# Patient Record
Sex: FEMALE | URBAN - METROPOLITAN AREA
[De-identification: names, ages, dates, MRNs, and addresses within clinical notes are randomized per-mention and may not be internally consistent; named-entity substitution may affect disease eponyms.]

---

## 2022-04-16 ENCOUNTER — NURSE TRIAGE (OUTPATIENT)
Dept: ADMINISTRATIVE | Facility: CLINIC | Age: 22
End: 2022-04-16

## 2022-04-17 NOTE — TELEPHONE ENCOUNTER
Pt calling to update that she is being evaluated in the ED currently. Asked to speak with previous triage nurse in this regard. Warm transferred.     Reason for Disposition   General information question, no triage required and triager able to answer question    Protocols used: INFORMATION ONLY CALL - NO TRIAGE-A-

## 2022-04-17 NOTE — TELEPHONE ENCOUNTER
Patient c/o worsening depression and history of Bipolar Disorder, Borderline Personality, Major Depressive Disorder and Anxiety. Patient states her coverage with Medicaid recently ended and she has not been able to afford her antipsychotic medication, Latuda. Patient also states that she is alone with  her 5 year old son and denies Suicidal Ideations and Homicidal Ideations at this time, but states history of suicidal ideations. Patient also c/o increased tiredness.     Care Advice to Go To ED Now. EMS/911 call offered by Triage RN. Patient declined EMS/911 activation by Triage RN and stated would call Aunt for transport to ED.        Reason for Disposition   [1] Bipolar disorder (manic depression) AND [2] unable to do any of normal activities (e.g., self care, school, work; in comparison to baseline).    Additional Information   Negative: Patient attempted suicide   Negative: Patient is threatening suicide now   Negative: Violent behavior, or threatening to physically hurt or kill someone   Negative: [1] Patient is very confused (disoriented, slurred speech) AND [2] no other adult (e.g., friend or family member) available   Negative: [1] Difficult to awaken or acting very confused (disoriented, slurred speech) AND [2] new-onset   Negative: Drug overdose suspected   Negative: Sounds like a life-threatening emergency to the triager   Negative: Suicide thoughts, threats, attempts, or questions   Negative: Questions or concerns about alcohol use, unhealthy alcohol use, binge drinking, intoxication, or withdrawal   Negative: Questions or concerns about substance use (drug use), unhealthy drug use, intoxication, or withdrawal    Protocols used: BIPOLAR DISORDER (MANIC DEPRESSION)-A-

## 2022-04-20 ENCOUNTER — NURSE TRIAGE (OUTPATIENT)
Dept: ADMINISTRATIVE | Facility: CLINIC | Age: 22
End: 2022-04-20

## 2022-04-20 NOTE — TELEPHONE ENCOUNTER
Anitra calling and asks how high does her temperature need to be to go to ED. Denies S&S of dehydration, resp distress, or chest discomfort. Protocol reviewed and care advice given. Pt agrees with advice and verbalizes understanding. Instructed to call for questions, concerns or changes.  Reason for Disposition   [1] Fever AND [2] no signs of serious infection or localizing symptoms (all other triage questions negative)    Additional Information   Negative: Shock suspected (e.g., cold/pale/clammy skin, too weak to stand, low BP, rapid pulse)   Negative: Difficult to awaken or acting confused (e.g., disoriented, slurred speech)   Negative: [1] Difficulty breathing AND [2] bluish lips, tongue or face   Negative: New-onset rash with multiple purple (or blood-colored) spots or dots   Negative: Sounds like a life-threatening emergency to the triager   Negative: [1] Headache AND [2] stiff neck (can't touch chin to chest)   Negative: Difficulty breathing   Negative: IV Drug Use (IVDU)   Negative: [1] Drinking very little AND [2] dehydration suspected (e.g., no urine > 12 hours, very dry mouth, very lightheaded)   Negative: Patient sounds very sick or weak to the triager  (Exception: mild weakness and hasn't taken fever medicine)   Negative: Fever > 104 F (40 C)   Negative: [1] Fever > 101 F (38.3 C) AND [2] age > 60 years   Negative: [1] Fever > 100.0 F (37.8 C) AND [2] bedridden (e.g., nursing home patient, CVA, chronic illness, recovering from surgery)   Negative: [1] Fever > 100.0 F (37.8 C) AND [2] indwelling urinary catheter (e.g., Ferrari, Coude)   Negative: [1] Fever > 100.0 F (37.8 C) AND [2] has port (portacath), central line, or PICC line   Negative: [1] Fever > 100.0 F (37.8 C) AND [2] diabetes mellitus or weak immune system (e.g., HIV positive, cancer chemo, splenectomy, organ transplant, chronic steroids)   Negative: [1] Fever > 100.0 F (37.8 C) AND [2] surgery in the last month   Negative:  Transplant patient (e.g., kidney, liver, lung, heart)   Negative: Fever present > 3 days (72 hours)   Negative: [1] Fever > 100.0 F (37.8 C) AND [2] foreign travel to a developing country in the last month   Negative: [1] Intermittent fever > 100.0 F (37.8 C) AND [2] lasts > 3 weeks    Protocols used: FEVER-A-AH

## 2022-04-20 NOTE — TELEPHONE ENCOUNTER
Pt called with c/o fever 102 and coughing up green colored phlegm and she said that she feels like she is getting worse. Pt triaged and  told care maxx is to see MD within 4 hours offered oac and rr but then realized she is in [place] pt said has no insurance so to to go to the [facility] or   Reason for Disposition   [1] Fever > 100.0 F (37.8 C) AND [2] diabetes mellitus or weak immune system (e.g., HIV positive, cancer chemo, splenectomy, organ transplant, chronic steroids)    Additional Information   Negative: Shock suspected (e.g., cold/pale/clammy skin, too weak to stand, low BP, rapid pulse)   Negative: Difficult to awaken or acting confused (e.g., disoriented, slurred speech)   Negative: [1] Difficulty breathing AND [2] bluish lips, tongue or face   Negative: New-onset rash with multiple purple (or blood-colored) spots or dots   Negative: Sounds like a life-threatening emergency to the triager   Negative: [1] Headache AND [2] stiff neck (can't touch chin to chest)   Negative: Difficulty breathing   Negative: IV Drug Use (IVDU)   Negative: [1] Drinking very little AND [2] dehydration suspected (e.g., no urine > 12 hours, very dry mouth, very lightheaded)   Negative: Patient sounds very sick or weak to the triager  (Exception: mild weakness and hasn't taken fever medicine)   Negative: [1] Fever > 100.0 F (37.8 C) AND [2] has port (portacath), central line, or PICC line   Negative: [1] Fever > 100.0 F (37.8 C) AND [2] indwelling urinary catheter (e.g., Ferrari, Coude)   Negative: [1] Fever > 100.0 F (37.8 C) AND [2] bedridden (e.g., nursing home patient, CVA, chronic illness, recovering from surgery)   Negative: [1] Fever > 101 F (38.3 C) AND [2] age > 60 years   Negative: Fever > 104 F (40 C)    Protocols used: FEVER-A-

## 2022-04-21 ENCOUNTER — NURSE TRIAGE (OUTPATIENT)
Dept: ADMINISTRATIVE | Facility: CLINIC | Age: 22
End: 2022-04-21

## 2022-04-21 NOTE — TELEPHONE ENCOUNTER
"OOC RN   Patient has been calling a few times in the last week.   I have been having URI.   Was in the ED yesterday.   On Z bob, bony shot Rocephin.  Coughing up BRB.  Says she is not sure where it is coming from because she is coughing and had an episode of throwing up.   Care advise is to go to ED now.  For any new or worsening symptoms to call back.   Patient Vu.      Reason for Disposition   Unclear to triager if the patient is coughing up blood or vomiting blood    Additional Information   Negative: SEVERE difficulty breathing (e.g., struggling for each breath, speaks in single words)   Negative: [1] Chest pain AND [2] difficulty breathing   Negative: Bluish (or gray) lips or face now   Negative: Passed out (i.e., lost consciousness, collapsed and was not responding)   Negative: Shock suspected (e.g., cold/pale/clammy skin, too weak to stand, low BP, rapid pulse)   Negative: Difficult to awaken or acting confused (e.g., disoriented, slurred speech)   Negative: Recent chest injury (i.e., past 24 hours)   Negative: [1] Coughed up blood AND [2] large amount (example: "a cup of blood")   Negative: Sounds like a life-threatening emergency to the triager   Negative: [1] MODERATE difficulty breathing (e.g., speaks in phrases, SOB even at rest, pulse 100-120) AND [2] still present when not coughing   Negative: Chest pain    Protocols used: COUGHING UP BLOOD-A-AH      "

## 2022-05-10 ENCOUNTER — NURSE TRIAGE (OUTPATIENT)
Dept: ADMINISTRATIVE | Facility: CLINIC | Age: 22
End: 2022-05-10

## 2022-05-11 NOTE — TELEPHONE ENCOUNTER
"Patient calling regarding chest pain. Reports she has been having intermittent chest and back pain for a few days. Per protocol, advised to go to ED now. Verbalized understanding. Knows to call back with new or worsening symptoms.       Reason for Disposition   [1] Chest pain (or "angina") comes and goes AND [2] is happening more often (increasing in frequency) or getting worse (increasing in severity) (Exception: chest pains that last only a few seconds)    Additional Information   Negative: SEVERE difficulty breathing (e.g., struggling for each breath, speaks in single words)   Negative: Difficult to awaken or acting confused (e.g., disoriented, slurred speech)   Negative: Shock suspected (e.g., cold/pale/clammy skin, too weak to stand, low BP, rapid pulse)   Negative: Passed out (i.e., fainted, collapsed and was not responding)   Negative: [1] Chest pain lasts > 5 minutes AND [2] age > 44   Negative: [1] Chest pain lasts > 5 minutes AND [2] age > 30 AND [3] one or more cardiac risk factors (e.g., diabetes, high blood pressure, high cholesterol, smoker, or strong family history of heart disease)   Negative: [1] Chest pain lasts > 5 minutes AND [2] history of heart disease (i.e., angina, heart attack, heart failure, bypass surgery, takes nitroglycerin)   Negative: [1] Chest pain lasts > 5 minutes AND [2] described as crushing, pressure-like, or heavy   Negative: Heart beating < 50 beats per minute OR > 140 beats per minute   Negative: Visible sweat on face or sweat dripping down face   Negative: Sounds like a life-threatening emergency to the triager   Negative: SEVERE chest pain    Protocols used: CHEST PAIN-A-AH      "

## 2022-06-20 ENCOUNTER — NURSE TRIAGE (OUTPATIENT)
Dept: ADMINISTRATIVE | Facility: CLINIC | Age: 22
End: 2022-06-20

## 2022-06-21 NOTE — TELEPHONE ENCOUNTER
"Pt with complaints of sore throat, vomiting, not feeling well for 1 week and worsening.  Care advice states to see a provider within 24 hours.  All questions answered.    Reason for Disposition   Earache also present    Additional Information   Negative: SEVERE difficulty breathing (e.g., struggling for each breath, speaks in single words, stridor)   Negative: Sounds like a life-threatening emergency to the triager   Negative: [1] Diagnosed strep throat AND [2] taking antibiotic AND [3] symptoms continue   Negative: [1] Drooling or spitting out saliva (because can't swallow) AND [2] normal breathing   Negative: Unable to open mouth completely   Negative: [1] Difficulty breathing AND [2] not severe   Negative: Fever > 104 F (40 C)   Negative: [1] Refuses to drink anything AND [2] for > 12 hours   Negative: [1] Drinking very little AND [2] dehydration suspected (e.g., no urine > 12 hours, very dry mouth, very lightheaded)   Negative: Patient sounds very sick or weak to the triager   Negative: SEVERE (e.g., excruciating) throat pain   Negative: [1] Pus on tonsils (back of throat) AND [2]  fever AND [3] swollen neck lymph nodes ("glands")   Negative: [1] Rash AND [2] widespread (especially chest and abdomen)    Protocols used: SORE THROAT-A-AH      "

## 2022-11-25 ENCOUNTER — NURSE TRIAGE (OUTPATIENT)
Dept: ADMINISTRATIVE | Facility: CLINIC | Age: 22
End: 2022-11-25

## 2022-11-25 NOTE — TELEPHONE ENCOUNTER
Pt with complaints of urinary frequency and blood in her urine for 3 days.  Care advice states to see a provider within 24 hours, offered OAC and pt accepted.  All questions answered, advised to call back for any worsening symptoms.    Reason for Disposition   Urinating more frequently than usual (i.e., frequency)    Additional Information   Negative: Shock suspected (e.g., cold/pale/clammy skin, too weak to stand, low BP, rapid pulse)   Negative: Sounds like a life-threatening emergency to the triager   Negative: [1] Unable to urinate (or only a few drops) > 4 hours AND [2] bladder feels very full (e.g., palpable bladder or strong urge to urinate)   Negative: [1] Decreased urination and [2] drinking very little AND [2] dehydration suspected (e.g., dark urine, no urine > 12 hours, very dry mouth, very lightheaded)   Negative: Patient sounds very sick or weak to the triager   Negative: Fever > 100.4 F (38.0 C)   Negative: Side (flank) or lower back pain present   Negative: [1] Can't control passage of urine (i.e., urinary incontinence) AND [2] new-onset (< 2 weeks) or worsening    Protocols used: Urinary Symptoms-A-AH

## 2023-01-28 ENCOUNTER — NURSE TRIAGE (OUTPATIENT)
Dept: ADMINISTRATIVE | Facility: CLINIC | Age: 23
End: 2023-01-28

## 2023-01-29 NOTE — TELEPHONE ENCOUNTER
"Caller states that she has vomiting and weakness s/p being diagnosis with UTI x 1 day ago. Caller states she is unable to keep fluids down and c/o lightheadedness and weakness.  Pt advised per protocol and verbalized understanding.   Reason for Disposition   Weak, dizzy or lightheaded    Additional Information   Negative: Shock suspected (e.g., cold/pale/clammy skin, too weak to stand, low BP, rapid pulse)   Negative: Difficult to awaken or acting confused (e.g., disoriented, slurred speech)   Negative: Unable to walk, or can only walk with assistance (e.g., requires support)   Negative: Fainted   Negative: [1] Vomited blood AND [2] large amount (example: "a cup of blood")   Negative: Rectal bleeding (i.e., bloody stool, blood in stool)   Negative: Sounds like a life-threatening emergency to the triager    Protocols used: Vomiting Blood-A-AH    "

## 2023-02-06 ENCOUNTER — NURSE TRIAGE (OUTPATIENT)
Dept: ADMINISTRATIVE | Facility: CLINIC | Age: 23
End: 2023-02-06

## 2023-02-07 NOTE — TELEPHONE ENCOUNTER
"PCP non [facility]    Prescribed metformin 500mg BID. x 2 weeks, 1 hour after taking nightly dose becomes sick to stomach and then vomits. Taking metformin with meals.    Also takes 4.5 Vraylar, buspar 15mg, Trintellix 10mg, and Parlodel 2.5mg nightly but none of those medications are new.     Per micromedex:  Common  Endocrine metabolic: Cobalamin deficiency (7% to 17.4% )  Gastrointestinal: Diarrhea (53% (immediate-release) ; 10% to 13% (extended-release) ), Flatulence (12% ), Indigestion (7% ), Malabsorption syndrome (Up to 9.9% ), Nausea (7% (extended-release) ), Vomiting (Up to 25.5% )    Tell patient to take immediate-release tablets and solution with meals and extended-release tablets and suspension with the evening meal.     Because of blood streaks in vomit and dizziness, go to ED. VU but declines. Only wanted advice about metformin. BG well controlled per pt. Advised to f/u with PCP regarding metformin mgmt and vomiting  Reason for Disposition   Weak, dizzy or lightheaded    Additional Information   Negative: Shock suspected (e.g., cold/pale/clammy skin, too weak to stand, low BP, rapid pulse)   Negative: Difficult to awaken or acting confused (e.g., disoriented, slurred speech)   Negative: Unable to walk, or can only walk with assistance (e.g., requires support)   Negative: Fainted   Negative: [1] Vomited blood AND [2] large amount (example: "a cup of blood")   Negative: Rectal bleeding (i.e., bloody stool, blood in stool)   Negative: Sounds like a life-threatening emergency to the triager    Protocols used: Vomiting Blood-A-AH    "

## 2023-02-07 NOTE — TELEPHONE ENCOUNTER
Pt calls with questions regarding her Metformin RX. She is currently located in [place]. Pt is transferred to Slime Reyes RN for further assistance.   Reason for Disposition   Caller has already spoken with another triager and has no further questions.    Protocols used: No Contact or Duplicate Contact Call-A-

## 2023-02-14 ENCOUNTER — NURSE TRIAGE (OUTPATIENT)
Dept: ADMINISTRATIVE | Facility: CLINIC | Age: 23
End: 2023-02-14

## 2023-02-15 NOTE — TELEPHONE ENCOUNTER
Got a tatoo and thinks that it is infection.  Reason for Disposition   Black (necrotic) or blisters develop in wound    Additional Information   Negative: [1] Widespread rash AND [2] bright red, sunburn-like AND [3] too weak to stand   Negative: Sounds like a life-threatening emergency to the triager   Negative: Stitches (or staples) and not infected   Negative: Skin glue used to close wound and not infected   Negative:  surgical wound infection suspected   Negative: Surgical wound infection suspected (post-op)   Negative: Animal bite wound infection suspected   Negative: Chronic wound care and Negative Pressure Wound Therapy (NPWT) symptoms and questions   Negative: [1] Widespread rash AND [2] bright red, sunburn-like   Negative: SEVERE pain in the wound    Protocols used: Wound Infection-A-AH

## 2023-03-04 ENCOUNTER — NURSE TRIAGE (OUTPATIENT)
Dept: ADMINISTRATIVE | Facility: CLINIC | Age: 23
End: 2023-03-04

## 2023-03-04 NOTE — TELEPHONE ENCOUNTER
Spoke with patient states she has a possible torn ligament in the right knee and ankle.  Patient rates her pain 6/10.  Patient states pain is worsened when bending and extending her knee.  Patient also reports hearing a popping sound when walking.  Advised patient to go to ED for evaluation.  Patient verbalized understanding.      Reason for Disposition   [1] Thigh or calf pain AND [2] only 1 side AND [3] present > 1 hour   Entire foot is cool or blue in comparison to other side    Additional Information   Negative: Sounds like a life-threatening emergency to the triager   Negative: [1] Swollen joint AND [2] fever   Negative: [1] Red area or streak AND [2] fever   Negative: Patient sounds very sick or weak to the triager   Negative: [1] SEVERE pain (e.g., excruciating, unable to walk) AND [2] not improved after 2 hours of pain medicine   Negative: [1] Can't move swollen joint at all AND [2] no fever    Protocols used: Knee Pain-A-AH, Ankle Pain-A-AH

## 2023-04-14 ENCOUNTER — NURSE TRIAGE (OUTPATIENT)
Dept: ADMINISTRATIVE | Facility: CLINIC | Age: 23
End: 2023-04-14

## 2023-04-15 NOTE — TELEPHONE ENCOUNTER
Patient states she is experiencing the onset of a hives like wide spread rash that was itching but the itching has resolved. She believes she is allergic to an insecticide her oyfriend sprayed or the iodine of a CT scan she had on yesterday. Patient is advised as per protocol     Reason for Disposition   [1] Widespread hives AND [2] onset < 2 hours of exposure to 1st dose of drug    Additional Information   Negative: [1] Life-threatening reaction (anaphylaxis) in the past to similar substance (e.g., food, insect bite/sting, chemical, etc.) AND [2] < 2 hours since exposure   Negative: [1] Sudden onset of rash (within last 2 hours) AND [2] difficulty breathing or swallowing   Negative: Shock suspected (e.g., cold/pale/clammy skin, too weak to stand, low BP, rapid pulse)   Negative: Difficult to awaken or acting confused (e.g., disoriented, slurred speech)   Negative: [1] Purple or blood-colored spots or dots AND [2] fever   Negative: Sounds like a life-threatening emergency to the triager   Negative: Insect bites suspected   Negative: Swimmer's Itch suspected   Negative: Sunburn suspected   Hives suspected   Negative: [1] Life-threatening reaction (anaphylaxis) in the past to similar substance (e.g., food, insect bite/sting, chemical, etc.) AND [2] < 2 hours since exposure   Negative: Difficulty breathing or wheezing now   Negative: [1] Swollen tongue AND [2] rapid onset   Negative: [1] Hoarseness or cough now AND [2] rapid onset   Negative: Shock suspected (e.g., cold/pale/clammy skin, too weak to stand, low BP, rapid pulse)   Negative: Difficult to awaken or acting confused (e.g., disoriented, slurred speech)   Negative: Sounds like a life-threatening emergency to the triager   [1] Drug rash suspected AND [2] started taking new medicine within last 2 weeks(Exception: antihistamine, eye drops, ear drops, decongestant or other OTC cough/cold medicines)   Negative: [1] Life-threatening reaction (anaphylaxis) in the  past to the same drug AND [2] < 2 hours since exposure   Negative: Difficulty breathing or wheezing   Negative: [1] Hoarseness or cough AND [2] started soon after 1st dose of drug   Negative: [1] Swollen tongue AND [2] started soon after 1st dose of drug   Negative: [1] Purple or blood-colored rash (spots or dots) AND [2] fever   Negative: Sounds like a life-threatening emergency to the triager   Negative: Rash is only on 1 part of the body (localized)   Negative: Taking new non-prescription (OTC) antihistamine, decongestant, ear drops, eye drops, or other OTC cough/cold medicine   Negative: Taking new prescription antihistamine, allergy medicine, asthma medicine, eye drops, ear drops or nose drops   Negative: Rash started more than 3 days after stopping new prescription medicine   Negative: Swollen tongue    Protocols used: Rash or Redness - Widespread-A-AH, Hives-A-AH, Rash - Widespread On Drugs-A-AH

## 2023-04-16 ENCOUNTER — NURSE TRIAGE (OUTPATIENT)
Dept: ADMINISTRATIVE | Facility: CLINIC | Age: 23
End: 2023-04-16

## 2023-04-16 NOTE — TELEPHONE ENCOUNTER
Caller states she accidentally took two anti psychotic tonight. Caller state that she is sleepy and was sleeping when I called.  Pt advised per protocol and verbalized understanding.     Reason for Disposition   [1] DOUBLE DOSE (an extra dose or lesser amount) of prescription drug AND [2] any symptoms (e.g., dizziness, nausea, pain, sleepiness)    Additional Information   Negative: MORE THAN A DOUBLE DOSE of a prescription or over-the-counter (OTC) drug    Protocols used: Medication Question Call-A-AH

## 2023-06-25 ENCOUNTER — NURSE TRIAGE (OUTPATIENT)
Dept: ADMINISTRATIVE | Facility: CLINIC | Age: 23
End: 2023-06-25

## 2023-06-25 NOTE — TELEPHONE ENCOUNTER
Reason for Disposition   [1] Difficulty breathing AND [2] not severe    Additional Information   Negative: SEVERE difficulty breathing (e.g., struggling for each breath, speaks in single words, stridor)   Negative: Sounds like a life-threatening emergency to the triager   Negative: [1] Drooling or spitting out saliva (because can't swallow) AND [2] new-onset   Negative: Unable to open mouth completely    Protocols used: Strep Throat Infection on Antibiotic Follow-up Call-A-AH  Pt called re has strep throat. Pt states her tonsils touch making it hard to breath. found out yest she has strep throat. Pt told her tonsils were big yest. Pt admits she is starting to have trouble breathing and has had this happen in the past causing worsening trouble breathing. Rec ED. Pt agrees and states she has a ride.

## 2023-06-27 ENCOUNTER — NURSE TRIAGE (OUTPATIENT)
Dept: ADMINISTRATIVE | Facility: CLINIC | Age: 23
End: 2023-06-27

## 2023-06-28 NOTE — TELEPHONE ENCOUNTER
Unable to leave , not set up.  Currently in [place].   Dx with strep on Saturday at  and began antbx. Seen in ED Sunday PM for SOB and vomiting and was given a penicillin shot.   New c/o fever, 99.8 via axillary. Current throat pain while swallowing is 4/10. Lymph nodes in neck also swollen.   Care advice provided per protocol, with recommendation to see MD within 24 hrs. Pt VU.   Unable to route to PCP    Reason for Disposition   [1] Taking antibiotic > 48 hours (2 days) for strep throat AND [2] fever persists    Additional Information   Negative: SEVERE difficulty breathing (e.g., struggling for each breath, speaks in single words, stridor)   Negative: Sounds like a life-threatening emergency to the triager   Negative: [1] Drooling or spitting out saliva (because can't swallow) AND [2] new-onset   Negative: Unable to open mouth completely   Negative: [1] Difficulty breathing AND [2] not severe   Negative: [1] Fever > 103 F (39.4 C) AND [2] taking antibiotic > 24 hours   Negative: [1] Drinking very little AND [2] dehydration suspected (e.g., no urine > 12 hours, very dry mouth, very lightheaded)   Negative: [1] Refuses to drink anything AND [2] for > 12 hours   Negative: Patient sounds very sick or weak to the triager   Negative: [1] Taking antibiotic > 24 hours for strep throat AND [2] sore throat pain is SEVERE    Protocols used: Strep Throat Infection on Antibiotic Follow-up Call-A-

## 2023-08-21 ENCOUNTER — NURSE TRIAGE (OUTPATIENT)
Dept: ADMINISTRATIVE | Facility: CLINIC | Age: 23
End: 2023-08-21

## 2023-08-22 NOTE — TELEPHONE ENCOUNTER
Drooling, sore throat, ear pain both ears, Runny nose, nausea, diarrhea and loss of appetite.  Patient has no PCP on record.  Patient is referred to ED and voices understanding.    Reason for Disposition   [1] Drooling or spitting out saliva (because can't swallow) AND [2] normal breathing    Additional Information   Negative: SEVERE difficulty breathing (e.g., struggling for each breath, speaks in single words, stridor)   Negative: Sounds like a life-threatening emergency to the triager   Negative: [1] Diagnosed strep throat AND [2] taking antibiotic AND [3] symptoms continue   Negative: Throat culture results, call about   Negative: Productive cough is main symptom   Negative: Non-productive cough is main symptom   Negative: Hoarseness is main symptom   Negative: Runny nose is main symptom   Negative: Uvula swelling is main symptom    Protocols used: Sore Throat-A-

## 2024-04-23 ENCOUNTER — NURSE TRIAGE (OUTPATIENT)
Dept: ADMINISTRATIVE | Facility: CLINIC | Age: 24
End: 2024-04-23

## 2024-04-23 NOTE — TELEPHONE ENCOUNTER
"Non-[facility] Patient/No PCP    Patient states she was diagnosed with a Pituitary Gland Tumor in 2021 and did not complete follow up care due to loss of insurance coverage. Patient states c/o visual changes/blurry vision, left side headache and ringing in the ears. Patient states headache feels like "someone is stabbing her in the head."    Patient advised to Go to [facility] Now and to have another adult drive her to ED facility. Patient also advised to contact the [facility] Triage service for any worsening symptoms. Patient states understanding of care advice.     Reason for Disposition   SEVERE headache    Additional Information   Negative: Weakness of the face, arm or leg on one side of the body   Negative: Followed getting substance in the eye   Negative: Foreign body stuck in the eye   Negative: Followed an eye injury   Negative: Followed sun lamp or sun exposure (UV keratitis)   Negative: Yellow or green discharge (pus) in the eye   Negative: Pregnant   Negative: Postpartum (from 0 to 6 weeks after delivery)   Negative: Complete loss of vision in one or both eyes   Negative: SEVERE eye pain    Protocols used: Vision Loss or Change-A-AH    "